# Patient Record
Sex: FEMALE | Race: WHITE | NOT HISPANIC OR LATINO | Employment: FULL TIME | URBAN - NONMETROPOLITAN AREA
[De-identification: names, ages, dates, MRNs, and addresses within clinical notes are randomized per-mention and may not be internally consistent; named-entity substitution may affect disease eponyms.]

---
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
## 2023-10-17 ENCOUNTER — OFFICE VISIT (OUTPATIENT)
Dept: OBSTETRICS AND GYNECOLOGY | Facility: CLINIC | Age: 18
End: 2023-10-17
Payer: COMMERCIAL

## 2023-10-17 VITALS
BODY MASS INDEX: 25.4 KG/M2 | HEIGHT: 64 IN | WEIGHT: 148.8 LBS | DIASTOLIC BLOOD PRESSURE: 62 MMHG | SYSTOLIC BLOOD PRESSURE: 110 MMHG

## 2023-10-17 DIAGNOSIS — N93.9 VAGINAL BLEEDING: Primary | ICD-10-CM

## 2023-10-17 DIAGNOSIS — Z30.46 ENCOUNTER FOR SURVEILLANCE OF IMPLANTABLE SUBDERMAL CONTRACEPTIVE: ICD-10-CM

## 2023-10-17 PROCEDURE — 1159F MED LIST DOCD IN RCRD: CPT | Performed by: PHYSICIAN ASSISTANT

## 2023-10-17 PROCEDURE — 99213 OFFICE O/P EST LOW 20 MIN: CPT | Performed by: PHYSICIAN ASSISTANT

## 2023-10-17 PROCEDURE — 1160F RVW MEDS BY RX/DR IN RCRD: CPT | Performed by: PHYSICIAN ASSISTANT

## 2023-10-17 RX ORDER — HYDROXYZINE HYDROCHLORIDE 25 MG/1
1 TABLET, FILM COATED ORAL 3 TIMES DAILY
COMMUNITY
Start: 2023-09-21

## 2023-10-17 RX ORDER — SERTRALINE HYDROCHLORIDE 25 MG/1
1 TABLET, FILM COATED ORAL DAILY
COMMUNITY
Start: 2023-10-06

## 2023-10-17 NOTE — PATIENT INSTRUCTIONS
No signs of vaginal trauma on exam.  I suspect this was coincidental bleeding as it started the day after intercourse  Bleeding appears to be very light at this point.  Recommend observation for the next few days.  Patient is advised to call if the bleeding goes beyond 1 more week or she has an increase in bleeding or any pain.  Signed paper for new Nexplanon.

## 2023-10-17 NOTE — PROGRESS NOTES
Subjective   Chief Complaint   Patient presents with    Vaginal Bleeding     C/O bleeding with intercourse       Lacey Lal is a 18 y.o. year old  presenting to be seen for vaginal bleeding  Reports that last week she was on vacation and had intercourse the last night on vacation.  The next day driving home on Saturday she began having vaginal bleeding and has continued to have some bleeding but is getting lighter today.  She has not experienced any pain or cramping. No vaginal discharge  She has Nexplanon which was placed 2020.  She has random light bleeds with Nexplanon.  She wants to get a new Nexplanon as current one is about to .  Declines STI screening.        Past Medical History:   Diagnosis Date    Anxiety     Clotting disorder 10/14/2023    Depression     Migraine         Current Outpatient Medications:     hydrOXYzine (ATARAX) 25 MG tablet, Take 1 tablet by mouth 3 times a day., Disp: , Rfl:     sertraline (ZOLOFT) 25 MG tablet, Take 1 tablet by mouth Daily., Disp: , Rfl:    No Known Allergies   Past Surgical History:   Procedure Laterality Date    GALLBLADDER SURGERY      LAPAROSCOPIC CHOLECYSTECTOMY      WISDOM TOOTH EXTRACTION        Social History     Socioeconomic History    Marital status: Single   Tobacco Use    Smoking status: Never    Smokeless tobacco: Never   Vaping Use    Vaping Use: Never used   Substance and Sexual Activity    Alcohol use: Never    Drug use: Never    Sexual activity: Yes     Partners: Male     Birth control/protection: Nexplanon      Family History   Problem Relation Age of Onset    Diabetes Maternal Grandmother     Diabetes Paternal Grandmother        Review of Systems   Constitutional:  Negative for chills, diaphoresis and fever.   Gastrointestinal:  Negative for constipation, diarrhea, nausea and vomiting.   Genitourinary:  Positive for vaginal bleeding. Negative for difficulty urinating, dysuria, menstrual problem and pelvic pain.          "  Objective   /62   Ht 162.6 cm (64\")   Wt 67.5 kg (148 lb 12.8 oz)   LMP 10/14/2023 (Exact Date)   BMI 25.54 kg/m²     Physical Exam  Constitutional:       Appearance: Normal appearance. She is well-developed and well-groomed.   Eyes:      General: Lids are normal.      Extraocular Movements: Extraocular movements intact.      Conjunctiva/sclera: Conjunctivae normal.   Genitourinary:     Labia:         Right: No rash, tenderness or lesion.         Left: No rash, tenderness or lesion.       Urethra: No prolapse, urethral pain, urethral swelling or urethral lesion.      Vagina: No signs of injury. Bleeding present. No vaginal discharge, tenderness or lesions.      Cervix: No cervical motion tenderness, discharge, friability or lesion.      Uterus: Not enlarged and not tender.       Adnexa:         Right: No mass or tenderness.          Left: No mass or tenderness.        Comments: Scant vaginal bleeding seen at cervical os  No vaginal trauma observed  Neurological:      Mental Status: She is alert.   Psychiatric:         Attention and Perception: Attention normal.         Mood and Affect: Mood normal.         Speech: Speech normal.         Behavior: Behavior is cooperative.            Result Review :                   Assessment and Plan  Diagnoses and all orders for this visit:    1. Vaginal bleeding (Primary)    2. Encounter for surveillance of implantable subdermal contraceptive      Patient Instructions   No signs of vaginal trauma on exam.  I suspect this was coincidental bleeding as it started the day after intercourse  Bleeding appears to be very light at this point.  Recommend observation for the next few days.  Patient is advised to call if the bleeding goes beyond 1 more week or she has an increase in bleeding or any pain.  Signed paper for new Nexplanon.           This note was electronically signed.    Caroline Robison PA-C   October 17, 2023  "

## 2024-05-28 DIAGNOSIS — Z30.017 NEXPLANON INSERTION: Primary | ICD-10-CM

## 2024-06-13 ENCOUNTER — OFFICE VISIT (OUTPATIENT)
Dept: OBSTETRICS AND GYNECOLOGY | Facility: CLINIC | Age: 19
End: 2024-06-13
Payer: COMMERCIAL

## 2024-06-13 VITALS
SYSTOLIC BLOOD PRESSURE: 120 MMHG | DIASTOLIC BLOOD PRESSURE: 68 MMHG | WEIGHT: 152.2 LBS | HEIGHT: 64 IN | BODY MASS INDEX: 25.99 KG/M2

## 2024-06-13 DIAGNOSIS — Z30.46 NEXPLANON REMOVAL: Primary | ICD-10-CM

## 2024-06-13 DIAGNOSIS — Z30.011 ENCOUNTER FOR INITIAL PRESCRIPTION OF CONTRACEPTIVE PILLS: ICD-10-CM

## 2024-06-13 RX ORDER — NORETHINDRONE ACETATE AND ETHINYL ESTRADIOL AND FERROUS FUMARATE 1MG-20(24)
1 KIT ORAL DAILY
Qty: 28 TABLET | Refills: 12 | Status: SHIPPED | OUTPATIENT
Start: 2024-06-13 | End: 2025-06-13

## 2024-06-13 NOTE — PATIENT INSTRUCTIONS
Start ocp today  Take ocp consistently  Ocp not effective until completes first pack  Follow up 1 year or prn

## 2024-06-13 NOTE — PROGRESS NOTES
"Subjective   Chief Complaint   Patient presents with    Contraception     Removal of Nexplanon in left arm. Patient would like birth control pills instead.        Lacey Lal is a 19 y.o. year old  presenting to be seen for  Nexplanon removal.  Nexplanon was due for removal in December.  Had originally wanted a new Nexplanon but changed her mond and wants ocp  Started menses 6 days ago  No history of VTE, stroke, migraine with aura    Past Medical History:   Diagnosis Date    Anxiety     Clotting disorder 10/14/2023    Depression     Migraine         Current Outpatient Medications:     norethindrone-ethinyl estradiol-ferrous fumarate (LOESTIN 24 FE) 1-20 MG-MCG(24) per tablet, Take 1 tablet by mouth Daily., Disp: 28 tablet, Rfl: 12   No Known Allergies   Past Surgical History:   Procedure Laterality Date    GALLBLADDER SURGERY      LAPAROSCOPIC CHOLECYSTECTOMY      WISDOM TOOTH EXTRACTION        Social History     Socioeconomic History    Marital status: Single   Tobacco Use    Smoking status: Never    Smokeless tobacco: Never   Vaping Use    Vaping status: Never Used   Substance and Sexual Activity    Alcohol use: Never    Drug use: Never    Sexual activity: Yes     Partners: Male     Birth control/protection: Nexplanon      Family History   Problem Relation Age of Onset    Diabetes Maternal Grandmother     Diabetes Paternal Grandmother        Review of Systems   Constitutional:  Negative for chills, diaphoresis and fever.   Gastrointestinal: Negative.    Genitourinary:  Negative for difficulty urinating and dysuria.           Objective   /68   Ht 162.6 cm (64\")   Wt 69 kg (152 lb 3.2 oz)   LMP 2024   BMI 26.13 kg/m²     Physical Exam  Constitutional:       Appearance: Normal appearance. She is well-developed and well-groomed.   Eyes:      General: Lids are normal.      Extraocular Movements: Extraocular movements intact.      Conjunctiva/sclera: Conjunctivae normal.   Neurological:      " Mental Status: She is alert.   Psychiatric:         Attention and Perception: Attention normal.         Mood and Affect: Mood normal.         Speech: Speech normal.         Behavior: Behavior is cooperative.            Result Review :                   Assessment and Plan  Diagnoses and all orders for this visit:    1. Nexplanon removal (Primary)    2. Encounter for initial prescription of contraceptive pills    Other orders  -     norethindrone-ethinyl estradiol-ferrous fumarate (LOESTIN 24 FE) 1-20 MG-MCG(24) per tablet; Take 1 tablet by mouth Daily.  Dispense: 28 tablet; Refill: 12      Patient Instructions   Start ocp today  Take ocp consistently  Ocp not effective until completes first pack  Follow up 1 year or prn           This note was electronically signed.    Caroline Robison PA-C   June 13, 2024

## 2024-06-13 NOTE — PROGRESS NOTES
Nexplanon Removal    Date of procedure:  6/13/2024    Risks and benefits discussed? yes  All questions answered? yes  Consents given by the patient  Written consent obtained? yes  Reason for removal: Device expiration    Local anesthesia used:  yes - 1.5 cc's of local anesthesia: 1% lidocaine with epinephrine    Procedure documentation:    The upper left arm was marked at the intended site of removal.  Betadine was used to cleanse the skin.  Local anesthesia was injected.  A vertical incision was created at the distal tip of the implant.  The implant was removed intact without difficulty.  Steri-strips were then placed across the site of insertion and the arm was wrapped.    She tolerated the procedure well.  There were no complications.  EBL was minimal.    Post procedure instructions: Remove the wrapping in 24 hours and the steri-strips in 5 days.    Follow up needed: PRN    This note was electronically signed.    Caroline Robison PA-C.  June 13, 2024

## 2025-01-18 ENCOUNTER — HOSPITAL ENCOUNTER (EMERGENCY)
Facility: HOSPITAL | Age: 20
Discharge: LEFT BEFORE DISCHARGE | End: 2025-01-18
Attending: EMERGENCY MEDICINE
Payer: COMMERCIAL

## 2025-01-18 ENCOUNTER — APPOINTMENT (OUTPATIENT)
Dept: CT IMAGING | Facility: HOSPITAL | Age: 20
End: 2025-01-18
Payer: COMMERCIAL

## 2025-01-18 VITALS
OXYGEN SATURATION: 99 % | TEMPERATURE: 98.6 F | RESPIRATION RATE: 18 BRPM | DIASTOLIC BLOOD PRESSURE: 81 MMHG | HEART RATE: 88 BPM | BODY MASS INDEX: 25.61 KG/M2 | SYSTOLIC BLOOD PRESSURE: 129 MMHG | HEIGHT: 64 IN | WEIGHT: 150 LBS

## 2025-01-18 DIAGNOSIS — N83.201 OVARIAN CYST, RIGHT: ICD-10-CM

## 2025-01-18 DIAGNOSIS — Z53.29 LEFT AGAINST MEDICAL ADVICE: Primary | ICD-10-CM

## 2025-01-18 DIAGNOSIS — R10.31 RIGHT LOWER QUADRANT ABDOMINAL PAIN: ICD-10-CM

## 2025-01-18 LAB
ALBUMIN SERPL-MCNC: 4.4 G/DL (ref 3.5–5.2)
ALBUMIN/GLOB SERPL: 1.6 G/DL
ALP SERPL-CCNC: 70 U/L (ref 39–117)
ALT SERPL W P-5'-P-CCNC: 11 U/L (ref 1–33)
ANION GAP SERPL CALCULATED.3IONS-SCNC: 12.7 MMOL/L (ref 5–15)
AST SERPL-CCNC: 19 U/L (ref 1–32)
B-HCG UR QL: NEGATIVE
BACTERIA UR QL AUTO: ABNORMAL /HPF
BASOPHILS # BLD AUTO: 0.06 10*3/MM3 (ref 0–0.2)
BASOPHILS NFR BLD AUTO: 0.7 % (ref 0–1.5)
BILIRUB SERPL-MCNC: 0.2 MG/DL (ref 0–1.2)
BILIRUB UR QL STRIP: NEGATIVE
BUN SERPL-MCNC: 10 MG/DL (ref 6–20)
BUN/CREAT SERPL: 10.6 (ref 7–25)
CALCIUM SPEC-SCNC: 9.3 MG/DL (ref 8.6–10.5)
CHLORIDE SERPL-SCNC: 103 MMOL/L (ref 98–107)
CLARITY UR: ABNORMAL
CO2 SERPL-SCNC: 24.3 MMOL/L (ref 22–29)
COLOR UR: YELLOW
CREAT SERPL-MCNC: 0.94 MG/DL (ref 0.57–1)
DEPRECATED RDW RBC AUTO: 38.5 FL (ref 37–54)
EGFRCR SERPLBLD CKD-EPI 2021: 88.7 ML/MIN/1.73
EOSINOPHIL # BLD AUTO: 0.09 10*3/MM3 (ref 0–0.4)
EOSINOPHIL NFR BLD AUTO: 1 % (ref 0.3–6.2)
ERYTHROCYTE [DISTWIDTH] IN BLOOD BY AUTOMATED COUNT: 12.5 % (ref 12.3–15.4)
GLOBULIN UR ELPH-MCNC: 2.8 GM/DL
GLUCOSE SERPL-MCNC: 102 MG/DL (ref 65–99)
GLUCOSE UR STRIP-MCNC: NEGATIVE MG/DL
HCT VFR BLD AUTO: 37.3 % (ref 34–46.6)
HGB BLD-MCNC: 13.1 G/DL (ref 12–15.9)
HGB UR QL STRIP.AUTO: NEGATIVE
HOLD SPECIMEN: NORMAL
HOLD SPECIMEN: NORMAL
HYALINE CASTS UR QL AUTO: ABNORMAL /LPF
IMM GRANULOCYTES # BLD AUTO: 0.03 10*3/MM3 (ref 0–0.05)
IMM GRANULOCYTES NFR BLD AUTO: 0.3 % (ref 0–0.5)
KETONES UR QL STRIP: NEGATIVE
LEUKOCYTE ESTERASE UR QL STRIP.AUTO: ABNORMAL
LIPASE SERPL-CCNC: 22 U/L (ref 13–60)
LYMPHOCYTES # BLD AUTO: 2.49 10*3/MM3 (ref 0.7–3.1)
LYMPHOCYTES NFR BLD AUTO: 27.2 % (ref 19.6–45.3)
MCH RBC QN AUTO: 29.8 PG (ref 26.6–33)
MCHC RBC AUTO-ENTMCNC: 35.1 G/DL (ref 31.5–35.7)
MCV RBC AUTO: 85 FL (ref 79–97)
MONOCYTES # BLD AUTO: 0.86 10*3/MM3 (ref 0.1–0.9)
MONOCYTES NFR BLD AUTO: 9.4 % (ref 5–12)
NEUTROPHILS NFR BLD AUTO: 5.61 10*3/MM3 (ref 1.7–7)
NEUTROPHILS NFR BLD AUTO: 61.4 % (ref 42.7–76)
NITRITE UR QL STRIP: NEGATIVE
NRBC BLD AUTO-RTO: 0 /100 WBC (ref 0–0.2)
PH UR STRIP.AUTO: 7 [PH] (ref 5–8)
PLATELET # BLD AUTO: 284 10*3/MM3 (ref 140–450)
PMV BLD AUTO: 10 FL (ref 6–12)
POTASSIUM SERPL-SCNC: 3.5 MMOL/L (ref 3.5–5.2)
PROT SERPL-MCNC: 7.2 G/DL (ref 6–8.5)
PROT UR QL STRIP: NEGATIVE
RBC # BLD AUTO: 4.39 10*6/MM3 (ref 3.77–5.28)
RBC # UR STRIP: ABNORMAL /HPF
REF LAB TEST METHOD: ABNORMAL
SODIUM SERPL-SCNC: 140 MMOL/L (ref 136–145)
SP GR UR STRIP: 1.01 (ref 1–1.03)
SQUAMOUS #/AREA URNS HPF: ABNORMAL /HPF
UROBILINOGEN UR QL STRIP: ABNORMAL
WBC # UR STRIP: ABNORMAL /HPF
WBC NRBC COR # BLD AUTO: 9.14 10*3/MM3 (ref 3.4–10.8)
WHOLE BLOOD HOLD COAG: NORMAL
WHOLE BLOOD HOLD SPECIMEN: NORMAL

## 2025-01-18 PROCEDURE — 81001 URINALYSIS AUTO W/SCOPE: CPT | Performed by: EMERGENCY MEDICINE

## 2025-01-18 PROCEDURE — 99285 EMERGENCY DEPT VISIT HI MDM: CPT | Performed by: EMERGENCY MEDICINE

## 2025-01-18 PROCEDURE — 96374 THER/PROPH/DIAG INJ IV PUSH: CPT

## 2025-01-18 PROCEDURE — 74177 CT ABD & PELVIS W/CONTRAST: CPT

## 2025-01-18 PROCEDURE — 85025 COMPLETE CBC W/AUTO DIFF WBC: CPT | Performed by: EMERGENCY MEDICINE

## 2025-01-18 PROCEDURE — 83690 ASSAY OF LIPASE: CPT | Performed by: EMERGENCY MEDICINE

## 2025-01-18 PROCEDURE — 81025 URINE PREGNANCY TEST: CPT

## 2025-01-18 PROCEDURE — 87591 N.GONORRHOEAE DNA AMP PROB: CPT

## 2025-01-18 PROCEDURE — 25010000002 KETOROLAC TROMETHAMINE PER 15 MG

## 2025-01-18 PROCEDURE — 87491 CHLMYD TRACH DNA AMP PROBE: CPT

## 2025-01-18 PROCEDURE — 25510000001 IOPAMIDOL 61 % SOLUTION: Performed by: EMERGENCY MEDICINE

## 2025-01-18 PROCEDURE — 80053 COMPREHEN METABOLIC PANEL: CPT | Performed by: EMERGENCY MEDICINE

## 2025-01-18 RX ORDER — ONDANSETRON 2 MG/ML
4 INJECTION INTRAMUSCULAR; INTRAVENOUS ONCE
Status: DISCONTINUED | OUTPATIENT
Start: 2025-01-18 | End: 2025-01-18 | Stop reason: HOSPADM

## 2025-01-18 RX ORDER — IOPAMIDOL 612 MG/ML
100 INJECTION, SOLUTION INTRAVASCULAR
Status: COMPLETED | OUTPATIENT
Start: 2025-01-18 | End: 2025-01-18

## 2025-01-18 RX ORDER — KETOROLAC TROMETHAMINE 30 MG/ML
15 INJECTION, SOLUTION INTRAMUSCULAR; INTRAVENOUS ONCE
Status: COMPLETED | OUTPATIENT
Start: 2025-01-18 | End: 2025-01-18

## 2025-01-18 RX ORDER — SODIUM CHLORIDE 0.9 % (FLUSH) 0.9 %
10 SYRINGE (ML) INJECTION AS NEEDED
Status: DISCONTINUED | OUTPATIENT
Start: 2025-01-18 | End: 2025-01-18 | Stop reason: HOSPADM

## 2025-01-18 RX ADMIN — KETOROLAC TROMETHAMINE 15 MG: 30 INJECTION, SOLUTION INTRAMUSCULAR; INTRAVENOUS at 15:28

## 2025-01-18 RX ADMIN — IOPAMIDOL 85 ML: 612 INJECTION, SOLUTION INTRAVENOUS at 15:41

## 2025-01-18 NOTE — ED PROVIDER NOTES
EMERGENCY DEPARTMENT ENCOUNTER    Pt Name: Lacey Lal  MRN: [id number]  Pt :   2005  Room Number:    Date of encounter:  2025  PCP: Provider, No Known  ED Provider: Abbe Alanis PA-C    Historian: Patient, nursing notes      HPI:  Chief Complaint: Abdominal pain        Context: Lacey Lal is a 19 y.o. female who presents to the ED c/o right lower quadrant abdominal pain for the past 2 days.  Patient denies any nausea or vomiting fever but does report intermittent chills.  Patient denies being on her menstrual cycle currently she denies vaginal bleeding, dysuria, vaginal discharge, or any other complaint today.  She does report being sexually active but does not think she is pregnant.      PAST MEDICAL HISTORY  Past Medical History:   Diagnosis Date    Anxiety     Clotting disorder 10/14/2023    Depression     Migraine          PAST SURGICAL HISTORY  Past Surgical History:   Procedure Laterality Date    GALLBLADDER SURGERY      LAPAROSCOPIC CHOLECYSTECTOMY      WISDOM TOOTH EXTRACTION           FAMILY HISTORY  Family History   Problem Relation Age of Onset    Diabetes Maternal Grandmother     Diabetes Paternal Grandmother          SOCIAL HISTORY  Social History     Socioeconomic History    Marital status: Single   Tobacco Use    Smoking status: Never    Smokeless tobacco: Never   Vaping Use    Vaping status: Never Used   Substance and Sexual Activity    Alcohol use: Never    Drug use: Never    Sexual activity: Yes     Partners: Male     Birth control/protection: Nexplanon         ALLERGIES  Patient has no known allergies.        REVIEW OF SYSTEMS  Review of Systems   Constitutional:  Negative for chills and fever.   HENT:  Negative for congestion and sore throat.    Respiratory:  Negative for cough and shortness of breath.    Cardiovascular:  Negative for chest pain.   Gastrointestinal:  Positive for abdominal pain, nausea and vomiting.   Genitourinary:  Negative for dysuria.    Musculoskeletal:  Negative for back pain.   Skin:  Negative for wound.   Neurological:  Negative for dizziness and headaches.   Psychiatric/Behavioral:  Negative for confusion.    All other systems reviewed and are negative.         All systems reviewed and negative except for those discussed in HPI.       PHYSICAL EXAM    I have reviewed the triage vital signs and nursing notes.    ED Triage Vitals [01/18/25 1423]   Temp Heart Rate Resp BP SpO2   98.6 °F (37 °C) 95 18 135/74 98 %      Temp src Heart Rate Source Patient Position BP Location FiO2 (%)   -- -- -- -- --       Physical Exam  Vitals and nursing note reviewed.   Constitutional:       General: She is not in acute distress.     Appearance: She is not ill-appearing, toxic-appearing or diaphoretic.   HENT:      Head: Normocephalic and atraumatic.      Mouth/Throat:      Mouth: Mucous membranes are moist.      Pharynx: Oropharynx is clear.   Eyes:      Extraocular Movements: Extraocular movements intact.   Cardiovascular:      Rate and Rhythm: Normal rate.      Heart sounds: Normal heart sounds.   Pulmonary:      Effort: Pulmonary effort is normal. No respiratory distress.      Breath sounds: Normal breath sounds.   Abdominal:      Tenderness: There is abdominal tenderness in the right lower quadrant and suprapubic area. There is no right CVA tenderness, left CVA tenderness, guarding or rebound.   Skin:     General: Skin is warm and dry.      Findings: No rash.   Neurological:      Mental Status: She is alert.             LAB RESULTS  Recent Results (from the past 24 hours)   Urinalysis With Microscopic If Indicated (No Culture) - Urine, Clean Catch    Collection Time: 01/18/25  2:57 PM    Specimen: Urine, Clean Catch   Result Value Ref Range    Color, UA Yellow Yellow, Straw    Appearance, UA Cloudy (A) Clear    pH, UA 7.0 5.0 - 8.0    Specific Gravity, UA 1.009 1.005 - 1.030    Glucose, UA Negative Negative    Ketones, UA Negative Negative    Bilirubin, UA  Negative Negative    Blood, UA Negative Negative    Protein, UA Negative Negative    Leuk Esterase, UA Trace (A) Negative    Nitrite, UA Negative Negative    Urobilinogen, UA 0.2 E.U./dL 0.2 - 1.0 E.U./dL   Pregnancy, Urine - Urine, Clean Catch    Collection Time: 01/18/25  2:57 PM    Specimen: Urine, Clean Catch   Result Value Ref Range    HCG, Urine QL Negative Negative   Urinalysis, Microscopic Only - Urine, Clean Catch    Collection Time: 01/18/25  2:57 PM    Specimen: Urine, Clean Catch   Result Value Ref Range    RBC, UA None Seen None Seen, 0-2 /HPF    WBC, UA None Seen None Seen, 0-2 /HPF    Bacteria, UA Trace (A) None Seen /HPF    Squamous Epithelial Cells, UA 3-6 (A) None Seen, 0-2 /HPF    Hyaline Casts, UA None Seen None Seen /LPF    Methodology Manual Light Microscopy    Comprehensive Metabolic Panel    Collection Time: 01/18/25  3:12 PM    Specimen: Blood   Result Value Ref Range    Glucose 102 (H) 65 - 99 mg/dL    BUN 10 6 - 20 mg/dL    Creatinine 0.94 0.57 - 1.00 mg/dL    Sodium 140 136 - 145 mmol/L    Potassium 3.5 3.5 - 5.2 mmol/L    Chloride 103 98 - 107 mmol/L    CO2 24.3 22.0 - 29.0 mmol/L    Calcium 9.3 8.6 - 10.5 mg/dL    Total Protein 7.2 6.0 - 8.5 g/dL    Albumin 4.4 3.5 - 5.2 g/dL    ALT (SGPT) 11 1 - 33 U/L    AST (SGOT) 19 1 - 32 U/L    Alkaline Phosphatase 70 39 - 117 U/L    Total Bilirubin 0.2 0.0 - 1.2 mg/dL    Globulin 2.8 gm/dL    A/G Ratio 1.6 g/dL    BUN/Creatinine Ratio 10.6 7.0 - 25.0    Anion Gap 12.7 5.0 - 15.0 mmol/L    eGFR 88.7 >60.0 mL/min/1.73   Lipase    Collection Time: 01/18/25  3:12 PM    Specimen: Blood   Result Value Ref Range    Lipase 22 13 - 60 U/L   Green Top (Gel)    Collection Time: 01/18/25  3:12 PM   Result Value Ref Range    Extra Tube Hold for add-ons.    Lavender Top    Collection Time: 01/18/25  3:12 PM   Result Value Ref Range    Extra Tube hold for add-on    Gold Top - SST    Collection Time: 01/18/25  3:12 PM   Result Value Ref Range    Extra Tube Hold  for add-ons.    Light Blue Top    Collection Time: 01/18/25  3:12 PM   Result Value Ref Range    Extra Tube Hold for add-ons.    CBC Auto Differential    Collection Time: 01/18/25  3:12 PM    Specimen: Blood   Result Value Ref Range    WBC 9.14 3.40 - 10.80 10*3/mm3    RBC 4.39 3.77 - 5.28 10*6/mm3    Hemoglobin 13.1 12.0 - 15.9 g/dL    Hematocrit 37.3 34.0 - 46.6 %    MCV 85.0 79.0 - 97.0 fL    MCH 29.8 26.6 - 33.0 pg    MCHC 35.1 31.5 - 35.7 g/dL    RDW 12.5 12.3 - 15.4 %    RDW-SD 38.5 37.0 - 54.0 fl    MPV 10.0 6.0 - 12.0 fL    Platelets 284 140 - 450 10*3/mm3    Neutrophil % 61.4 42.7 - 76.0 %    Lymphocyte % 27.2 19.6 - 45.3 %    Monocyte % 9.4 5.0 - 12.0 %    Eosinophil % 1.0 0.3 - 6.2 %    Basophil % 0.7 0.0 - 1.5 %    Immature Grans % 0.3 0.0 - 0.5 %    Neutrophils, Absolute 5.61 1.70 - 7.00 10*3/mm3    Lymphocytes, Absolute 2.49 0.70 - 3.10 10*3/mm3    Monocytes, Absolute 0.86 0.10 - 0.90 10*3/mm3    Eosinophils, Absolute 0.09 0.00 - 0.40 10*3/mm3    Basophils, Absolute 0.06 0.00 - 0.20 10*3/mm3    Immature Grans, Absolute 0.03 0.00 - 0.05 10*3/mm3    nRBC 0.0 0.0 - 0.2 /100 WBC       If labs were ordered, I independently reviewed the results and considered them in treating the patient.        RADIOLOGY  CT Abdomen Pelvis With Contrast    Result Date: 1/18/2025  CT ABDOMEN AND PELVIS WITH CONTRAST  INDICATION: Right lower quadrant pain. Pelvic pressure.  TECHNIQUE: Thin section axial images were obtained from the lung bases through the pubic symphysis after oral and intravenous contrast. Coronal reconstruction images were obtained from the axial data.  COMPARISON: None.  FINDINGS:  Lower chest:  Lung bases are clear.  Liver: There is focal fatty change adjacent to the falciform ligament. The liver is otherwise homogeneous  Gallbladder/biliary system: Cholecystectomy. No intrahepatic or extrahepatic biliary dilatation.  Spleen:  Unremarkable.  Adrenal glands:  Unremarkable. No nodules.  Pancreas: No mass.  No peripancreatic fluid or peripancreatic inflammation.  Kidneys/ureters/bladder:  No hydronephrosis, solid mass or perinephric stranding. No acute abnormality of the urinary bladder.  GI tract: The stomach is mildly distended with fluid and debris. No evidence of small bowel obstruction or focal small bowel wall thickening. Normal appendix. No acute colon abnormality.  Pelvic organs: The uterus is unremarkable for age. There is a 3.3 cm right adnexal cyst, likely of ovarian origin. The left ovary is unremarkable.  Lymph nodes/mesentery/retroperitoneum: No lymphadenopathy.  Abdominal wall: Abdominal wall intact.  Vascular: No aortic aneurysm.  Free fluid: Physiologic free fluid is noted.  Bones: No acute osseous abnormality.      1. Right adnexal cyst, likely an ovarian cyst. This is not an unexpected finding in a patient of this age. Consider follow-up ultrasound in 6 or 10 weeks to ensure expected resolution. 2. Normal appendix.      CTDI: 6.71 mGy DLP:303.36 mGy.cm       This report was signed and finalized on 1/18/2025 3:49 PM by Mary Moses MD.       I ordered and independently reviewed the above noted radiographic studies.      I viewed images of CT abdomen and pelvis which showed right ovarian cyst per my independent interpretation.    See radiologist's dictation for official interpretation.        PROCEDURES    Procedures    No orders to display       MEDICATIONS GIVEN IN ER    Medications   sodium chloride 0.9 % flush 10 mL (has no administration in time range)   ondansetron (ZOFRAN) injection 4 mg (4 mg Intravenous Not Given 1/18/25 1528)   ketorolac (TORADOL) injection 15 mg (15 mg Intravenous Given 1/18/25 1528)   iopamidol (ISOVUE-300) 61 % injection 100 mL (85 mL Intravenous Given 1/18/25 1541)         MEDICAL DECISION MAKING, PROGRESS, and CONSULTS    All labs, if obtained, have been independently reviewed by me.  All radiology studies, if obtained, have been reviewed by me and the radiologist  dictating the report.  All EKG's, if obtained, have been independently viewed and interpreted by me/my attending physician.      Discussion below represents my analysis of pertinent findings related to patient's condition, differential diagnosis, treatment plan and final disposition.    patient is a 19-year-old female presenting for evaluation of right lower quadrant abdominal pain.  On exam she is noticeably tender in the right lower quadrant and suprapubic tenderness noted with no guarding or rebound.  Her vitals and pulse oximetry as independent interpreted by me today are all stable and within normal limits.  Given concern for her right lower quadrant pain perhaps being due to appendicitis CT abdomen and pelvis scan was ordered.  I did offer the patient a transvaginal ultrasound which she refused stating she did not feel comfortable getting that done here and wanted it with her OB/GYN.  CT abdomen and pelvis scan was positive for a right ovarian cyst given concern for the patient's degree of pain and the possibility of the existence of ovarian torsion    I did inform the patient that I highly recommended we get a transvaginal ultrasound today to rule out ovarian torsion or other more morbid condition.  The patient again refused this study.  As such I explained to the patient that ovarian torsion could be a potentially dangerous condition leading to loss of reproductive organs or even death she understood the risks of this but still wanted to be discharged,  the patient therefore left the hospital AGAINST MEDICAL ADVICE.                     Differential diagnosis:    Differential diagnosis included but was not limited to ovarian cyst rupture, ovarian torsion, appendicitis, gastroenteritis, uterine fibroids, menstrual cramps      Additional sources:    - Discussed/ obtained information from independent historians:   Patient significant other at    -Past medical records: Previous medical records reviewed none    -  Chronic or social conditions impacting care: None    Orders placed during this visit:  Orders Placed This Encounter   Procedures    Chlamydia trachomatis, Neisseria gonorrhoeae, PCR - Urine, Urine, Clean Catch    CT Abdomen Pelvis With Contrast    [place] Draw    Comprehensive Metabolic Panel    Lipase    Urinalysis With Microscopic If Indicated (No Culture) - Urine, Clean Catch    CBC Auto Differential    Pregnancy, Urine - Urine, Clean Catch    Urinalysis, Microscopic Only - Urine, Clean Catch    NPO Diet NPO Type: Strict NPO    Undress & Gown    Insert Peripheral IV    CBC & Differential    Green Top (Gel)    Lavender Top    Gold Top - SST    Light Blue Top         Additional orders considered but not ordered: None      ED Course:    Consultants: None                Shared Decision Making:  After my consideration of clinical presentation and any laboratory/radiology studies obtained, I discussed the findings with the patient/patient representative who is in agreement with the treatment plan and the final disposition.   Risks and benefits of discharge and/or observation/admission were discussed.       AS OF 18:16 EST VITALS:    BP - 129/81  HR - 88  TEMP - 98.6 °F (37 °C)  O2 SATS - 99%                  DIAGNOSIS  Final diagnoses:   Right lower quadrant abdominal pain   Ovarian cyst, right   Left against medical advice         DISPOSITION   AMA      Please note that portions of this document were completed with voice recognition software.      Abbe Alanis PA-C  01/18/25 5854

## 2025-01-18 NOTE — DISCHARGE INSTRUCTIONS
You have been seen and evaluated for abdominal pain today.  Your CT scan shows a right ovarian cyst, which is the location of your pain.  We had clinical concern for possible ovarian torsion as a result of this cyst and recommended a transvaginal ultrasound to ensure there is no complication involving your uterus or ovaries, which you have refused.  You have decided to leave against our medical advice.  We still highly encourage you to return to the ER immediately for any acute changes or worsening of your condition or continued pain, and follow up with your OBGYN.  This advice does not endorse you decision to leave the ER today against medical advice.

## 2025-01-22 LAB
C TRACH RRNA SPEC QL NAA+PROBE: NEGATIVE
N GONORRHOEA RRNA SPEC QL NAA+PROBE: NEGATIVE

## 2025-03-04 ENCOUNTER — OFFICE VISIT (OUTPATIENT)
Dept: OBSTETRICS AND GYNECOLOGY | Facility: CLINIC | Age: 20
End: 2025-03-04
Payer: COMMERCIAL

## 2025-03-04 VITALS
HEIGHT: 64 IN | SYSTOLIC BLOOD PRESSURE: 116 MMHG | WEIGHT: 161.6 LBS | BODY MASS INDEX: 27.59 KG/M2 | DIASTOLIC BLOOD PRESSURE: 68 MMHG

## 2025-03-04 DIAGNOSIS — Z87.42 HISTORY OF OVARIAN CYST: Primary | ICD-10-CM

## 2025-03-04 NOTE — PROGRESS NOTES
"Subjective   Chief Complaint   Patient presents with    Follow-up     ER follow-up ovarian cyst. TVS done today       Lacey Lal is a 20 y.o. year old  presenting to be seen for follow up right ovary cyst.  Patient was seen in the ED in mid January with right lower quadrant pain.  A CT scan determined a 3.3 cm right adnexal cyst.  Presents for follow-up today.  A pelvic ultrasound done today notes resolution of the previously seen a right adnexal cyst.  Uterus and bilateral ovaries appear normal and there is no free fluid.  ..Patient's last menstrual period was 2025 (exact date).   Patient reports that she stopped her birth control pills in November or December due to an insurance issue.  When the insurance got straightened out she decided she did not want to go back on OCPs or any contraception.  She has no complaints or concerns today.    Past Medical History:   Diagnosis Date    Anxiety     Clotting disorder 10/14/2023    Depression     Migraine     Ovarian cyst       No current outpatient medications on file.   No Known Allergies   Past Surgical History:   Procedure Laterality Date    GALLBLADDER SURGERY      LAPAROSCOPIC CHOLECYSTECTOMY      WISDOM TOOTH EXTRACTION        Social History     Socioeconomic History    Marital status: Single   Tobacco Use    Smoking status: Never    Smokeless tobacco: Never   Vaping Use    Vaping status: Never Used   Substance and Sexual Activity    Alcohol use: Never    Drug use: Never    Sexual activity: Yes     Partners: Male     Birth control/protection: None      Family History   Problem Relation Age of Onset    Diabetes Maternal Grandmother     Diabetes Paternal Grandmother        Review of Systems   Constitutional: Negative.    Gastrointestinal: Negative.    Genitourinary: Negative.            Objective   /68   Ht 162.6 cm (64\")   Wt 73.3 kg (161 lb 9.6 oz)   LMP 2025 (Exact Date)   BMI 27.74 kg/m²     Physical Exam  Constitutional:       " Appearance: Normal appearance. She is well-developed and well-groomed.   Eyes:      General: Lids are normal.      Extraocular Movements: Extraocular movements intact.      Conjunctiva/sclera: Conjunctivae normal.   Neurological:      General: No focal deficit present.      Mental Status: She is alert and oriented to person, place, and time.   Psychiatric:         Attention and Perception: Attention normal.         Mood and Affect: Mood normal.         Speech: Speech normal.         Behavior: Behavior is cooperative.            Result Review :           CT Abdomen Pelvis With Contrast (01/18/2025 15:41)   US Non-ob Transvaginal (03/04/2025 09:41)       Assessment and Plan  Diagnoses and all orders for this visit:    1. History of ovarian cyst (Primary)  -     US Non-ob Transvaginal      Patient Instructions   Patient reassured previously seen right ovarian cyst resolved  She declines contraception  Follow-up as needed           This note was electronically signed.    Caroline Robison PA-C   March 4, 2025

## 2025-03-04 NOTE — PATIENT INSTRUCTIONS
Patient reassured previously seen right ovarian cyst resolved  She declines contraception  Follow-up as needed

## 2025-04-22 ENCOUNTER — OFFICE VISIT (OUTPATIENT)
Age: 20
End: 2025-04-22
Payer: COMMERCIAL

## 2025-04-22 VITALS
TEMPERATURE: 98 F | HEART RATE: 98 BPM | SYSTOLIC BLOOD PRESSURE: 128 MMHG | WEIGHT: 163.7 LBS | OXYGEN SATURATION: 98 % | DIASTOLIC BLOOD PRESSURE: 80 MMHG | BODY MASS INDEX: 28.1 KG/M2

## 2025-04-22 DIAGNOSIS — R10.9 ABDOMINAL PAIN, UNSPECIFIED ABDOMINAL LOCATION: Primary | ICD-10-CM

## 2025-04-22 DIAGNOSIS — R53.83 OTHER FATIGUE: ICD-10-CM

## 2025-04-22 PROCEDURE — 84443 ASSAY THYROID STIM HORMONE: CPT | Performed by: FAMILY MEDICINE

## 2025-04-22 PROCEDURE — 84439 ASSAY OF FREE THYROXINE: CPT | Performed by: FAMILY MEDICINE

## 2025-04-22 NOTE — PROGRESS NOTES
"     Female Physical Note      Date: 2025   Patient Name: Lacey Lal  : 2005   MRN: [id number]     Chief Complaint:    Chief Complaint   Patient presents with    Annual Exam    Abdominal Pain       History of Present Illness: aLcey Lal is a 20 y.o. female who is here today for their annual health maintenance and physical. Abdominal pain and \"issues\".        States that any time she goes to have a BM she will have intense pain in her abdomen. States that pain is sharp in nature.  Does have to have a BM after eating and states that she gets a huge urge to go.  Has been having issues for about 10 plus years.   States that she did have her gallbladder removed when she was a child.      Does state that she also has anxiety, has been on medication that she felt didn't help (Sertraline).    Does also admit to having problems with fatigue, stating that she feels that she has difficulty with getting up and doing stuff at times.        Subjective      Review of Systems:   Review of Systems   Constitutional:  Negative for appetite change and unexpected weight loss.   HENT:  Negative for trouble swallowing.    Eyes:  Negative for blurred vision and double vision.   Respiratory:  Negative for cough and shortness of breath.    Cardiovascular:  Negative for chest pain and leg swelling.   Gastrointestinal:  Negative for blood in stool.   Endocrine: Negative for cold intolerance, heat intolerance and polyuria.   Musculoskeletal:  Negative for joint swelling.   Skin:  Negative for color change and bruise.   Neurological:  Negative for numbness and memory problem.   Hematological:  Does not bruise/bleed easily.   Psychiatric/Behavioral:  Negative for suicidal ideas and depressed mood. The patient is not nervous/anxious.        Past Medical History, Social History, Family History and Care Team were all reviewed with patient and updated as appropriate.     Medications:   No current outpatient medications on " file.    Allergies:   No Known Allergies    Immunizations:  Health Maintenance Summary            Current Care Gaps       TDAP/TD VACCINES (2 - Td or Tdap) Overdue since 6/4/2024 06/04/2014  Imm Admin: Tdap              COVID-19 Vaccine (3 - 2024-25 season) Overdue since 9/1/2024 07/28/2021  Imm Admin: COVID-19 (PFIZER) Purple Cap Monovalent    07/07/2021  Imm Admin: COVID-19 (PFIZER) Purple Cap Monovalent              MENINGOCOCCAL B VACCINE (1 of 2 - Standard) Never done     No completion, postpone, or frequency change history exists for this topic.              HEPATITIS C SCREENING (Once) Never done     No completion, postpone, or frequency change history exists for this topic.              ANNUAL PHYSICAL (Yearly) Never done     No completion, postpone, or frequency change history exists for this topic.              PAP SMEAR (Every 3 Years) Never done     No completion, postpone, or frequency change history exists for this topic.                      Upcoming       INFLUENZA VACCINE (Yearly - July to March) Next due on 7/1/2025      10/17/2018  Imm Admin: Fluzone (or Fluarix & Flulaval for VFC) >6mos    09/27/2017  Imm Admin: Fluzone (or Fluarix & Flulaval for VFC) >6mos              CHLAMYDIA SCREENING (Yearly) Next due on 1/18/2026 01/18/2025  Chlamydia trachomatis, NICO component of Chlamydia trachomatis, Neisseria gonorrhoeae, PCR - Urine, Urine, Clean Catch    10/17/2023  Postponed until 10/17/2024 by Caroline Robison PA-C (Patient Refused)    04/11/2022  Postponed until 4/11/2023 by Caroline Robison PA-C (Patient Refused)                      Completed or No Longer Recommended       MENINGOCOCCAL VACCINE (Series Information) Completed      02/28/2019  Imm Admin: Meningococcal MCV4P (Menactra)    06/04/2014  Imm Admin: Meningococcal MCV4P (Menactra)              HPV VACCINES (Series Information) Completed      08/10/2017  Imm Admin: Hpv9    08/05/2014  Imm Admin: HPV Quadrivalent          "     Pneumococcal Vaccine 0-49 (Series Information) Aged Out      05/02/2005  Imm Admin: PEDS-Pneumococcal Conjugate (PCV7)    2005  Imm Admin: PEDS-Pneumococcal Conjugate (PCV7)    2005  Imm Admin: PEDS-Pneumococcal Conjugate (PCV7)    2005  Imm Admin: PEDS-Pneumococcal Conjugate (PCV7)                             No orders of the defined types were placed in this encounter.       Colorectal Screening:   Not due  Last Completed Colonoscopy    This patient has no relevant Health Maintenance data.       Pap: Scheduled  Last Completed Pap Smear    This patient has no relevant Health Maintenance data.        Mammogram: Not due  Last Completed Mammogram    This patient has no relevant Health Maintenance data.            HIV (Age 15-65 once): No results found for: \"HIV1X2\"  A1c: No results found for: \"HGBA1C\"   Lipid panel:  No results found for: \"LIPIDEXCLUSI\"    The ASCVD Risk score (Iza BRADLEY, et al., 2019) failed to calculate for the following reasons:    The 2019 ASCVD risk score is only valid for ages 40 to 79      Ophthalmologist: Up-to-date  Dentist: Up-to-date    Tobacco Use: Low Risk  (4/22/2025)    Patient History     Smoking Tobacco Use: Never     Smokeless Tobacco Use: Never     Passive Exposure: Not on file       Social History     Substance and Sexual Activity   Alcohol Use Never        Social History     Substance and Sexual Activity   Drug Use Yes    Types: Marijuana        Diet/Physical activity: Continue to encourage patient to have a active lifestyle    Sexual Health:  contraception, no attempting pregnancy       Depression: PHQ-2 Depression Screening  PHQ-9 Total Score:           Objective     Physical Exam:  Vital Signs:   Vitals:    04/22/25 1029 04/22/25 1103   BP: 138/88 128/80   Pulse: 98    Temp: 98 °F (36.7 °C)    TempSrc: Infrared    SpO2: 98%    Weight: 74.3 kg (163 lb 11.2 oz)      Body mass index is 28.1 kg/m².     Physical Exam  Vitals and nursing note reviewed. "   Constitutional:       Appearance: Normal appearance.   HENT:      Head: Normocephalic and atraumatic.   Eyes:      General: Lids are normal.      Conjunctiva/sclera: Conjunctivae normal.   Cardiovascular:      Rate and Rhythm: Normal rate and regular rhythm.   Pulmonary:      Effort: Pulmonary effort is normal.      Breath sounds: Normal breath sounds and air entry.   Abdominal:      General: Abdomen is flat. Bowel sounds are normal.      Palpations: Abdomen is soft.   Musculoskeletal:      Cervical back: Full passive range of motion without pain and normal range of motion.   Neurological:      General: No focal deficit present.      Mental Status: She is alert and oriented to person, place, and time.   Psychiatric:         Attention and Perception: Attention normal.         Mood and Affect: Mood normal.         Behavior: Behavior normal. Behavior is cooperative.         POCT Results (if applicable);   Results for orders placed or performed during the hospital encounter of 01/18/25   Urinalysis With Microscopic If Indicated (No Culture) - Urine, Clean Catch    Collection Time: 01/18/25  2:57 PM    Specimen: Urine, Clean Catch   Result Value Ref Range    Color, UA Yellow Yellow, Straw    Appearance, UA Cloudy (A) Clear    pH, UA 7.0 5.0 - 8.0    Specific Gravity, UA 1.009 1.005 - 1.030    Glucose, UA Negative Negative    Ketones, UA Negative Negative    Bilirubin, UA Negative Negative    Blood, UA Negative Negative    Protein, UA Negative Negative    Leuk Esterase, UA Trace (A) Negative    Nitrite, UA Negative Negative    Urobilinogen, UA 0.2 E.U./dL 0.2 - 1.0 E.U./dL   Pregnancy, Urine - Urine, Clean Catch    Collection Time: 01/18/25  2:57 PM    Specimen: Urine, Clean Catch   Result Value Ref Range    HCG, Urine QL Negative Negative   Urinalysis, Microscopic Only - Urine, Clean Catch    Collection Time: 01/18/25  2:57 PM    Specimen: Urine, Clean Catch   Result Value Ref Range    RBC, UA None Seen None Seen, 0-2  /HPF    WBC, UA None Seen None Seen, 0-2 /HPF    Bacteria, UA Trace (A) None Seen /HPF    Squamous Epithelial Cells, UA 3-6 (A) None Seen, 0-2 /HPF    Hyaline Casts, UA None Seen None Seen /LPF    Methodology Manual Light Microscopy    Chlamydia trachomatis, Neisseria gonorrhoeae, PCR - Urine, Urine, Clean Catch    Collection Time: 01/18/25  2:59 PM    Specimen: Urine, Clean Catch   Result Value Ref Range    Chlamydia trachomatis, NICO Negative Negative    Neisseria gonorrhoeae, NICO Negative Negative   Comprehensive Metabolic Panel    Collection Time: 01/18/25  3:12 PM    Specimen: Blood   Result Value Ref Range    Glucose 102 (H) 65 - 99 mg/dL    BUN 10 6 - 20 mg/dL    Creatinine 0.94 0.57 - 1.00 mg/dL    Sodium 140 136 - 145 mmol/L    Potassium 3.5 3.5 - 5.2 mmol/L    Chloride 103 98 - 107 mmol/L    CO2 24.3 22.0 - 29.0 mmol/L    Calcium 9.3 8.6 - 10.5 mg/dL    Total Protein 7.2 6.0 - 8.5 g/dL    Albumin 4.4 3.5 - 5.2 g/dL    ALT (SGPT) 11 1 - 33 U/L    AST (SGOT) 19 1 - 32 U/L    Alkaline Phosphatase 70 39 - 117 U/L    Total Bilirubin 0.2 0.0 - 1.2 mg/dL    Globulin 2.8 gm/dL    A/G Ratio 1.6 g/dL    BUN/Creatinine Ratio 10.6 7.0 - 25.0    Anion Gap 12.7 5.0 - 15.0 mmol/L    eGFR 88.7 >60.0 mL/min/1.73   Lipase    Collection Time: 01/18/25  3:12 PM    Specimen: Blood   Result Value Ref Range    Lipase 22 13 - 60 U/L   CBC Auto Differential    Collection Time: 01/18/25  3:12 PM    Specimen: Blood   Result Value Ref Range    WBC 9.14 3.40 - 10.80 10*3/mm3    RBC 4.39 3.77 - 5.28 10*6/mm3    Hemoglobin 13.1 12.0 - 15.9 g/dL    Hematocrit 37.3 34.0 - 46.6 %    MCV 85.0 79.0 - 97.0 fL    MCH 29.8 26.6 - 33.0 pg    MCHC 35.1 31.5 - 35.7 g/dL    RDW 12.5 12.3 - 15.4 %    RDW-SD 38.5 37.0 - 54.0 fl    MPV 10.0 6.0 - 12.0 fL    Platelets 284 140 - 450 10*3/mm3    Neutrophil % 61.4 42.7 - 76.0 %    Lymphocyte % 27.2 19.6 - 45.3 %    Monocyte % 9.4 5.0 - 12.0 %    Eosinophil % 1.0 0.3 - 6.2 %    Basophil % 0.7 0.0 - 1.5 %     Immature Grans % 0.3 0.0 - 0.5 %    Neutrophils, Absolute 5.61 1.70 - 7.00 10*3/mm3    Lymphocytes, Absolute 2.49 0.70 - 3.10 10*3/mm3    Monocytes, Absolute 0.86 0.10 - 0.90 10*3/mm3    Eosinophils, Absolute 0.09 0.00 - 0.40 10*3/mm3    Basophils, Absolute 0.06 0.00 - 0.20 10*3/mm3    Immature Grans, Absolute 0.03 0.00 - 0.05 10*3/mm3    nRBC 0.0 0.0 - 0.2 /100 WBC   Green Top (Gel)    Collection Time: 01/18/25  3:12 PM   Result Value Ref Range    Extra Tube Hold for add-ons.    Lavender Top    Collection Time: 01/18/25  3:12 PM   Result Value Ref Range    Extra Tube hold for add-on    Gold Top - SST    Collection Time: 01/18/25  3:12 PM   Result Value Ref Range    Extra Tube Hold for add-ons.    Light Blue Top    Collection Time: 01/18/25  3:12 PM   Result Value Ref Range    Extra Tube Hold for add-ons.         Procedures    Assessment / Plan      Assessment/Plan:   Diagnoses and all orders for this visit:    1. Abdominal pain, unspecified abdominal location (Primary)  -     Ambulatory Referral to Gastroenterology    2. Other fatigue  -     TSH; Future  -     T4, free; Future  -     TSH  -     T4, free         Healthcare Maintenance:  Counseling provided based on age appropriate USPSTF guidelines.  BMI is >= 25 and <30. (Overweight) The following options were offered after discussion;: exercise counseling/recommendations    Lacey Vee Lal voices understanding and acceptance of this advice and will call back with any further questions or concerns. AVS with preventive healthcare tips printed for patient.     Vaccine Counseling:      Follow Up:   Return in about 1 year (around 4/22/2026) for Annual physical.    I have spent a total of 30 min on reviewing test results/preparing to see patient, counseling patient, performing medically appropriate exam and documenting clinical information in the electronic or other health record.         Ken Feng, DO  [facility]

## 2025-04-23 LAB
T4 FREE SERPL-MCNC: 1.16 NG/DL (ref 0.92–1.68)
TSH SERPL DL<=0.05 MIU/L-ACNC: 1.65 UIU/ML (ref 0.27–4.2)

## 2025-04-28 ENCOUNTER — PATIENT ROUNDING (BHMG ONLY) (OUTPATIENT)
Age: 20
End: 2025-04-28
Payer: COMMERCIAL

## 2025-04-28 NOTE — PROGRESS NOTES
A [employer] message has been sent to the patient for PATIENT ROUNDING with [facility] 1.